# Patient Record
Sex: MALE | Race: WHITE | NOT HISPANIC OR LATINO
[De-identification: names, ages, dates, MRNs, and addresses within clinical notes are randomized per-mention and may not be internally consistent; named-entity substitution may affect disease eponyms.]

---

## 2017-01-27 ENCOUNTER — RECORD ABSTRACTING (OUTPATIENT)
Age: 67
End: 2017-01-27

## 2017-01-27 DIAGNOSIS — M21.162 VARUS DEFORMITY, NOT ELSEWHERE CLASSIFIED, LEFT KNEE: ICD-10-CM

## 2017-01-27 DIAGNOSIS — Z82.62 FAMILY HISTORY OF OSTEOPOROSIS: ICD-10-CM

## 2017-01-27 DIAGNOSIS — Z85.9 PERSONAL HISTORY OF MALIGNANT NEOPLASM, UNSPECIFIED: ICD-10-CM

## 2017-01-27 DIAGNOSIS — M25.562 PAIN IN LEFT KNEE: ICD-10-CM

## 2017-04-10 ENCOUNTER — APPOINTMENT (OUTPATIENT)
Dept: ORTHOPEDIC SURGERY | Facility: CLINIC | Age: 67
End: 2017-04-10

## 2017-04-10 VITALS
HEIGHT: 72 IN | BODY MASS INDEX: 25.73 KG/M2 | DIASTOLIC BLOOD PRESSURE: 79 MMHG | SYSTOLIC BLOOD PRESSURE: 135 MMHG | WEIGHT: 190 LBS | HEART RATE: 58 BPM

## 2017-04-10 DIAGNOSIS — Z96.652 PRESENCE OF LEFT ARTIFICIAL KNEE JOINT: ICD-10-CM

## 2017-04-10 DIAGNOSIS — M17.12 UNILATERAL PRIMARY OSTEOARTHRITIS, LEFT KNEE: ICD-10-CM

## 2021-12-21 ENCOUNTER — APPOINTMENT (OUTPATIENT)
Dept: VASCULAR SURGERY | Facility: CLINIC | Age: 71
End: 2021-12-21

## 2022-01-25 ENCOUNTER — APPOINTMENT (OUTPATIENT)
Dept: VASCULAR SURGERY | Facility: CLINIC | Age: 72
End: 2022-01-25
Payer: MEDICARE

## 2022-01-25 VITALS
HEART RATE: 65 BPM | HEIGHT: 72 IN | SYSTOLIC BLOOD PRESSURE: 138 MMHG | WEIGHT: 183 LBS | DIASTOLIC BLOOD PRESSURE: 80 MMHG | BODY MASS INDEX: 24.79 KG/M2

## 2022-01-25 PROCEDURE — 99204 OFFICE O/P NEW MOD 45 MIN: CPT

## 2022-01-25 PROCEDURE — 93880 EXTRACRANIAL BILAT STUDY: CPT

## 2022-01-25 RX ORDER — TAMSULOSIN HYDROCHLORIDE 0.4 MG/1
0.4 CAPSULE ORAL
Refills: 0 | Status: ACTIVE | COMMUNITY

## 2022-01-25 RX ORDER — HYDROMORPHONE HYDROCHLORIDE 4 MG/1
4 TABLET ORAL
Refills: 0 | Status: DISCONTINUED | COMMUNITY
End: 2022-01-25

## 2022-01-25 RX ORDER — CELECOXIB 200 MG/1
200 CAPSULE ORAL
Refills: 0 | Status: DISCONTINUED | COMMUNITY
End: 2022-01-25

## 2022-01-28 NOTE — ASSESSMENT
[FreeTextEntry1] : 71yoM w/PMHx HTN, HLD, BPH, current "on/off cigarette smoker" but daily marijuana use, referred by Dr. Bell for evaluation of his asymptomatic carotid stenosis found on screening carotid sonogram.  Pt denies any neurologic symptoms that might be related to his carotid stenosis, and denies any family h/o CVA/TIA.  He is currently compliant w/ASA/statin therapy.  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\par \par No neuro deficits noted on exam and carotid duplex performed to assess for ICA stenosis reveals L ICA stenosis <50% w/large heterogenous plaque at bulb, mild <50% R ICA stenosis, good antegrade flow noted in vertebral arteries b/l.  Recommended that pt continue all medications and RTO in 1y for surveillance duplex.\par \par I, Dr. Asael Reis, personally performed the evaluation and management (E/M) services for this new patient.  That E/M includes conducting the initial examination, assessing all conditions, and establishing the plan of care.  Today, ACP, Tom Navarro, was here to observe my evaluation and management services for this patient to be followed going forward.

## 2022-01-28 NOTE — PHYSICAL EXAM
[Normal Thyroid] : the thyroid was normal [Normal Breath Sounds] : Normal breath sounds [Respiratory Effort] : normal respiratory effort [Normal Heart Sounds] : normal heart sounds [Normal Rate and Rhythm] : normal rate and rhythm [2+] : left 2+ [No HSM] : no hepatosplenomegaly [No Rash or Lesion] : No rash or lesion [Alert] : alert [Calm] : calm [JVD] : no jugular venous distention  [Carotid Bruits] : no carotid bruits [Right Carotid Bruit] : no bruit heard over the right carotid [Left Carotid Bruit] : no bruit heard over the left carotid [Ankle Swelling (On Exam)] : not present [Varicose Veins Of Lower Extremities] : not present [] : not present [Abdomen Masses] : No abdominal masses [Abdomen Tenderness] : ~T ~M No abdominal tenderness [de-identified] : Well-nourished, NAD [de-identified] : NC/AT, sandraicthumaira, EOMIx6 [de-identified] : FROM throughout, strength 5/5x4 [de-identified] : CNII-XII/AIME grossly intact, steady gait

## 2022-01-28 NOTE — HISTORY OF PRESENT ILLNESS
[FreeTextEntry1] : 71yoM w/PMHx HTN, HLD, BPH, current "on/off cigarette smoker" but daily marijuana use, referred by Dr. Bell for evaluation of his asymptomatic carotid stenosis found on screening carotid sonogram.  Pt denies any neurologic symptoms that might be related to his carotid stenosis, and denies any family h/o CVA/TIA.  He is currently compliant w/ASA/statin therapy.  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

## 2022-01-28 NOTE — ADDENDUM
[FreeTextEntry1] : This note was written by Tom Garcia, acting as a scribe for Dr. Asael Reis.  I, Dr. Asael Reis, have read and attest that all the information, medical decision-making, and discharge instructions within are true and accurate.\par \par I, Dr. Asael Reis, personally performed the evaluation and management (E/M) services for this new patient.  That E/M includes conducting the initial examination, assessing all conditions, and establishing the plan of care.  Today, my ACP, Tom Garcia, was here to observe my evaluation and management services for this patient to be followed going forward.

## 2022-01-28 NOTE — PROCEDURE
[FreeTextEntry1] : Carotid duplex performed to assess for ICA stenosis reveals L ICA stenosis <50% w/large heterogenous plaque at bulb, mild <50% R ICA stenosis, good antegrade flow noted in vertebral arteries b/l

## 2022-07-23 ENCOUNTER — RESULT REVIEW (OUTPATIENT)
Age: 72
End: 2022-07-23

## 2022-07-25 ENCOUNTER — RESULT REVIEW (OUTPATIENT)
Age: 72
End: 2022-07-25

## 2022-07-26 ENCOUNTER — TRANSCRIPTION ENCOUNTER (OUTPATIENT)
Age: 72
End: 2022-07-26

## 2022-07-26 ENCOUNTER — APPOINTMENT (OUTPATIENT)
Dept: VASCULAR SURGERY | Facility: CLINIC | Age: 72
End: 2022-07-26

## 2022-08-01 ENCOUNTER — TRANSCRIPTION ENCOUNTER (OUTPATIENT)
Age: 72
End: 2022-08-01

## 2022-08-03 ENCOUNTER — RESULT REVIEW (OUTPATIENT)
Age: 72
End: 2022-08-03

## 2022-08-08 ENCOUNTER — APPOINTMENT (OUTPATIENT)
Dept: NEUROSURGERY | Facility: CLINIC | Age: 72
End: 2022-08-08
Payer: MEDICARE

## 2022-08-08 PROCEDURE — XXXXX: CPT | Mod: 1L

## 2022-08-08 NOTE — ASSESSMENT
[FreeTextEntry1] : Mr. Morrell presents status post right craniotomy for evacuation of subdural hematoma under the direction of Dr. Demetrio Killian. He remains clinically stable.  Surveillance CT head from 8/4/22 reveals a slight interval increase in volume of residual right frontoparietal fluid when compared with CT head from 7/25/22.\par \par Natural history discussed. Alternative management strategies reviewed.  I plan to obtain a CT head without contrast in 1 week with an appointment to follow to assess for interval increase. \par \par I have asked the patient to contact me or Dr. Killian for any symptomatic development or progression in the interim at which time we can obtain expedited follow up imaging.\par \par A total of 45 minutes were spent relative to this encounter.\par

## 2022-08-08 NOTE — HISTORY OF PRESENT ILLNESS
[FreeTextEntry1] : Mr. Morrell present sin neurosurgical follow up status post right craniotomy for evacuation of subdural hematoma. He has a PMHx of diverticulosis, HTN, and on ASA prophylaxis. On 7/23/22, he presented to the emergency room at [facility] with one week complain of gait instability and left- sided weakness. Patient reports upon return from [place] last month, he noticed  drifting to the left while walking with intermittent episodes involving bumping into walls, table, and chairs. In addition, reports  difficulty with fine motor skills, such as buttoning his shirt and tying his shoes as well as transient self limited headaches. Initial evaluation was arranged by his outpatient Primary care physician , Dr. Suh, which prompted a MRI brain. MRI brain and follow up CT head from 7/23/22 revealed a large right-sided subdural hematoma with right-to-left midline shift. Patient underwent a right craniotomy for evacuation of subdural hematoma on 7/23/22 under the direction of Dr. Demetrio Killian. Neurological examination was within normal limits. Hospital course uncomplicated and he was discharged on 7/26/22 with prescribed keppra. \par \par Today he denies complete resolution of initial symptoms. Reports right sided transient headaches, which are self limited and resolve without medication management. He has completed prescribed dosage of Keppra. Surveillance CT head from 8/4/22, detailed below. Denies other neurological symptoms. Presently, patient denies headache.

## 2022-08-08 NOTE — DATA REVIEWED
[de-identified] : CT HEAD WITHOUT CONTRAST: 8/4/22: IMPRESSION- Predominantly hypodense right frontoparietal subdural collection. The maximum \par  diameter of the collection measures 1.5 cm, slightly increased from the prior \par  study at which time it measured 1.3 cm. No midline shift. The ventricular \par  system is stable. No new intracranial hemorrhage. \par CT HEAD WITHOUT CONTRAST: 7/25/22: IMPRESSION- Interval removal of right subdural drain without substantial change in right subdural collection. \par Increased right pneumocephalus. No significant mass effect or midline shift. [de-identified] : MRI BRAIN WITHOUT CONTRAST: IMPRESSION- 7/23/22: IMPRESSION- Moderately large right frontal parietal holohemispheric subacute subdural hematoma with\par midline shift to the left.  Previously Declined (within the last year)

## 2022-08-08 NOTE — PHYSICAL EXAM
[General Appearance - Alert] : alert [General Appearance - In No Acute Distress] : in no acute distress [General Appearance - Well Nourished] : well nourished [Oriented To Time, Place, And Person] : oriented to person, place, and time [Impaired Insight] : insight and judgment were intact [Affect] : the affect was normal [Cranial Nerves Oculomotor (III)] : extraocular motion intact [Cranial Nerves Optic (II)] : visual acuity intact bilaterally,  pupils equal round and reactive to light [Cranial Nerves Trigeminal (V)] : facial sensation intact symmetrically [Cranial Nerves Facial (VII)] : face symmetrical [Cranial Nerves Vestibulocochlear (VIII)] : hearing was intact bilaterally [Cranial Nerves Glossopharyngeal (IX)] : tongue and palate midline [Cranial Nerves Accessory (XI - Cranial And Spinal)] : head turning and shoulder shrug symmetric [Cranial Nerves Hypoglossal (XII)] : there was no tongue deviation with protrusion [Motor Tone] : muscle tone was normal in all four extremities [Motor Strength] : muscle strength was normal in all four extremities [Sensation Tactile Decrease] : light touch was intact [Abnormal Walk] : normal gait [Balance] : balance was intact [Longitudinal] : longitudinal [Clean] : clean [Dry] : dry [Intact] : intact [No Drainage] : without drainage [Normal Skin] : normal [FreeTextEntry1] : Right frontoparietal

## 2022-08-14 ENCOUNTER — RESULT REVIEW (OUTPATIENT)
Age: 72
End: 2022-08-14

## 2022-08-16 ENCOUNTER — APPOINTMENT (OUTPATIENT)
Dept: NEUROSURGERY | Facility: CLINIC | Age: 72
End: 2022-08-16

## 2022-08-16 PROCEDURE — 99024 POSTOP FOLLOW-UP VISIT: CPT

## 2022-08-16 NOTE — HISTORY OF PRESENT ILLNESS
[FreeTextEntry1] : Mr. Morrell presents in neurosurgical follow up. Underwent right craniotomy for evacuation of subacute subdural hematoma on 7/23/22. He has had complete resolution of preoperative symptoms and has no neurological complaints. Surveillance CT head 8/15/22 detailed below. Denies neurological symptoms at this time.

## 2022-08-16 NOTE — ASSESSMENT
[FreeTextEntry1] : Mr. Morrell presents status post right craniotomy for evacuation of subacute subdural hematoma. He is neurologically normal. CT 8/15/22 reviewed independently by me demonstrates stable volume of residual right hemispheric subdural fluid products when compared directly with CT head 8/4/22. There is no indication for additional neurosurgical intervention at this time. I plan to obtain surveillance CT head without contrast in 2 weeks with an appointment to follow. \par \par I have asked the patient to contact me for any symptomatic development or progression in the interim at which time we can obtain expedited follow up imaging.\par \par A total of 45 minutes were spent relative to this encounter.\par \par \par \par

## 2022-08-16 NOTE — DATA REVIEWED
[de-identified] : CT HEAD WITHOUT CONTRAST: 8/15/22: IMPRESSION- Status post right parietal craniotomy. Stable overall size of small to \par  moderate broad-based right hemispheric convexity subdural collection with \par  interval increased intermediate density within the subdural collection which \par  may represent a component of subacute hemorrhage. Residual slight mass effect \par  on right hemisphere and slight midline shift to left. Recommend follow-up to \par  resolution. \par \par  No acute intracerebral hemorrhage.

## 2022-08-29 ENCOUNTER — RESULT REVIEW (OUTPATIENT)
Age: 72
End: 2022-08-29

## 2022-08-30 ENCOUNTER — APPOINTMENT (OUTPATIENT)
Dept: NEUROSURGERY | Facility: CLINIC | Age: 72
End: 2022-08-30

## 2022-08-30 VITALS
WEIGHT: 176 LBS | OXYGEN SATURATION: 98 % | HEART RATE: 79 BPM | BODY MASS INDEX: 23.84 KG/M2 | HEIGHT: 72 IN | SYSTOLIC BLOOD PRESSURE: 120 MMHG | DIASTOLIC BLOOD PRESSURE: 70 MMHG | RESPIRATION RATE: 12 BRPM

## 2022-08-30 PROCEDURE — 99024 POSTOP FOLLOW-UP VISIT: CPT

## 2022-08-30 NOTE — ASSESSMENT
[FreeTextEntry1] : Mr. Morrell presents status post right craniotomy for evacuation of subacute subdural hematoma. He is neurologically normal. CT 8/30/22 reviewed independently by me demonstrates interval modest decrease in volume of residual right hemispheric subdural fluid products when compared directly with CT head 8/15/22. There is an increase in density of some of the subdural fluid, suggesting membrane formation. Given the overall stability versus decrease in volume, there is no indication for additional neurosurgical intervention at this time. I plan to obtain a routine non-contrast head CT in 3 weeks time. I will contact the patient by phone once I have had the opportunity to review this next CT.\par \par I have asked the patient to contact me for any symptomatic development or progression in the interim at which time we can obtain expedited follow up imaging.\par \par A total of 45 minutes were spent relative to this encounter.\par

## 2022-08-30 NOTE — PHYSICAL EXAM
[General Appearance - Alert] : alert [General Appearance - In No Acute Distress] : in no acute distress [General Appearance - Well Nourished] : well nourished [Oriented To Time, Place, And Person] : oriented to person, place, and time [Impaired Insight] : insight and judgment were intact [Affect] : the affect was normal [Cranial Nerves Optic (II)] : visual acuity intact bilaterally,  pupils equal round and reactive to light [Cranial Nerves Oculomotor (III)] : extraocular motion intact [Cranial Nerves Trigeminal (V)] : facial sensation intact symmetrically [Cranial Nerves Facial (VII)] : face symmetrical [Cranial Nerves Vestibulocochlear (VIII)] : hearing was intact bilaterally [Cranial Nerves Glossopharyngeal (IX)] : tongue and palate midline [Cranial Nerves Accessory (XI - Cranial And Spinal)] : head turning and shoulder shrug symmetric [Cranial Nerves Hypoglossal (XII)] : there was no tongue deviation with protrusion [Motor Tone] : muscle tone was normal in all four extremities [Motor Strength] : muscle strength was normal in all four extremities [Sensation Tactile Decrease] : light touch was intact [Abnormal Walk] : normal gait [Balance] : balance was intact [Longitudinal] : longitudinal [Clean] : clean [Dry] : dry [Intact] : intact [No Drainage] : without drainage [FreeTextEntry1] : right parietal

## 2022-08-30 NOTE — HISTORY OF PRESENT ILLNESS
[FreeTextEntry1] : Mr. Morrell presents in neurosurgical follow up. Underwent right craniotomy for evacuation of subacute subdural hematoma on 7/23/22. He has had complete resolution of preoperative symptoms and has no neurological complaints. Surveillance CT head 8/30/22 detailed below. Denies neurological symptoms at this time, including headaches, gait instability, extremity numbness/weakness, visual disturbances\par

## 2022-08-30 NOTE — DATA REVIEWED
[de-identified] : CT HEAD WITHOUT CONTRAST: 8/30/22: reviewed independently by me demonstrates mixed density right-sided subdural hematoma, stable compared to prior.  No new mass effect or midline shift

## 2022-09-06 ENCOUNTER — APPOINTMENT (OUTPATIENT)
Dept: VASCULAR SURGERY | Facility: CLINIC | Age: 72
End: 2022-09-06

## 2022-09-06 VITALS
DIASTOLIC BLOOD PRESSURE: 74 MMHG | HEIGHT: 72 IN | WEIGHT: 176 LBS | SYSTOLIC BLOOD PRESSURE: 132 MMHG | BODY MASS INDEX: 23.84 KG/M2 | HEART RATE: 72 BPM

## 2022-09-06 PROCEDURE — 99213 OFFICE O/P EST LOW 20 MIN: CPT

## 2022-09-06 PROCEDURE — 93880 EXTRACRANIAL BILAT STUDY: CPT

## 2022-09-09 NOTE — HISTORY OF PRESENT ILLNESS
[FreeTextEntry1] : 72yoM w/PMHx HTN, HLD, BPH, "on/off cigarette smoker" but daily marijuana use, originally referred by Dr. Bell for evaluation of his asymptomatic carotid stenosis found on screening carotid sonogram, returning today for surveillance.  Pt sustained a trauma to the head 3mos prior and was diagnosed w/an intracranial bleed that later caused headaches.  He underwent craniotomy and evacuation of the bleed, stabilized, and was discharged home w/o ASA.  Currently, pt denies any neurologic symptoms that might be related to his carotid stenosis, still not taking daily ASA, but is due to f/u w/neuro in 1wk at which time, they will instruct him on future SAPT.  He is active and compliant w/all other meds.

## 2022-09-09 NOTE — PHYSICAL EXAM
[Normal Thyroid] : the thyroid was normal [Normal Breath Sounds] : Normal breath sounds [Respiratory Effort] : normal respiratory effort [Normal Heart Sounds] : normal heart sounds [Normal Rate and Rhythm] : normal rate and rhythm [2+] : left 2+ [No HSM] : no hepatosplenomegaly [No Rash or Lesion] : No rash or lesion [Alert] : alert [Calm] : calm [JVD] : no jugular venous distention  [Carotid Bruits] : no carotid bruits [Right Carotid Bruit] : no bruit heard over the right carotid [Left Carotid Bruit] : no bruit heard over the left carotid [Ankle Swelling (On Exam)] : not present [Varicose Veins Of Lower Extremities] : not present [] : not present [Abdomen Masses] : No abdominal masses [Abdomen Tenderness] : ~T ~M No abdominal tenderness [de-identified] : Well-nourished, NAD [de-identified] : NC/AT, sandraicthumaira, EOMIx6 [de-identified] : FROM throughout, strength 5/5x4 [de-identified] : CNII-XII/AIME grossly intact, steady gait

## 2022-09-09 NOTE — ASSESSMENT
[FreeTextEntry1] : 72yoM w/PMHx HTN, HLD, BPH, "on/off cigarette smoker" but daily marijuana use, originally referred by Dr. Bell for evaluation of his asymptomatic carotid stenosis found on screening carotid sonogram, returning today for surveillance.  Pt sustained a trauma to the head 3mos prior and was diagnosed w/an intracranial bleed that later caused headaches.  He underwent craniotomy and evacuation of the bleed, stabilized, and was discharged home w/o ASA.  Currently, pt denies any neurologic symptoms that might be related to his carotid stenosis, still not taking daily ASA, but is due to f/u w/neuro in 1wk at which time, they will instruct him on future SAPT.  He is active and compliant w/all other meds.\par \par No neuro deficits noted on exam and large carotid duplex performed to assess for ICA stenosis reveals L ICA stenosis <50% w/large heterogenous plaque at bulb, mild <50% R ICA stenosis, good antegrade flow noted in vertebral arteries b/l.  Recommended that pt continue all medications and RTO in 1y for surveillance duplex and restart daily ASA as soon as possible to minimize stroke risk.  Pt will contact us after his appointment w/neurology.

## 2022-09-28 ENCOUNTER — RESULT REVIEW (OUTPATIENT)
Age: 72
End: 2022-09-28

## 2022-11-28 ENCOUNTER — RESULT REVIEW (OUTPATIENT)
Age: 72
End: 2022-11-28

## 2022-12-06 ENCOUNTER — APPOINTMENT (OUTPATIENT)
Dept: SURGERY | Facility: CLINIC | Age: 72
End: 2022-12-06

## 2022-12-06 VITALS
HEART RATE: 71 BPM | WEIGHT: 184 LBS | TEMPERATURE: 98.6 F | SYSTOLIC BLOOD PRESSURE: 152 MMHG | HEIGHT: 72 IN | BODY MASS INDEX: 24.92 KG/M2 | DIASTOLIC BLOOD PRESSURE: 79 MMHG

## 2022-12-06 PROCEDURE — 99204 OFFICE O/P NEW MOD 45 MIN: CPT

## 2022-12-06 RX ORDER — DUTASTERIDE 0.5 MG/1
0.5 CAPSULE, LIQUID FILLED ORAL
Qty: 90 | Refills: 0 | Status: ACTIVE | COMMUNITY
Start: 2022-08-10

## 2022-12-06 RX ORDER — ROSUVASTATIN CALCIUM 40 MG/1
40 TABLET, FILM COATED ORAL
Qty: 90 | Refills: 0 | Status: ACTIVE | COMMUNITY
Start: 2022-11-11

## 2022-12-06 NOTE — HISTORY OF PRESENT ILLNESS
[de-identified] : The patient is referred by Dr Sonny Suh for evaluation and discussion regarding an enlarging right inguinal hernia..\par \par He has some discomfort and swelling which has been progressing over the past few year. It is certainly larger and he hears bowel sounds at times.  \par

## 2022-12-06 NOTE — ASSESSMENT
[FreeTextEntry1] : right inguinal hernia, recommended a robotic assisted laparoscopic RIHR with mesh, possible left, possible open\par The risks benefits and alternatives were discussed and the patient agrees to the described plan.\par

## 2022-12-06 NOTE — PHYSICAL EXAM
[Normal Breath Sounds] : Normal breath sounds [Normal Heart Sounds] : normal heart sounds [Alert] : alert [Oriented to Person] : oriented to person [Oriented to Place] : oriented to place [Oriented to Time] : oriented to time [Calm] : calm [de-identified] : NAD [de-identified] : soft, benign, right inguinal hernia

## 2022-12-20 ENCOUNTER — APPOINTMENT (OUTPATIENT)
Dept: NEUROSURGERY | Facility: CLINIC | Age: 72
End: 2022-12-20

## 2022-12-20 PROCEDURE — 99215 OFFICE O/P EST HI 40 MIN: CPT | Mod: 95

## 2022-12-20 NOTE — HISTORY OF PRESENT ILLNESS
[FreeTextEntry1] : Mr. Morrell has agreed to two-way audiovisual telehealth consultation. He is located at her home [address] [place] [address]  and I am located at my office at [facility].\par \par  presents in neurosurgical follow up. Underwent right craniotomy for evacuation of subacute subdural hematoma on 7/23/22 with complete resolution of preoperative symptoms and has no neurological complaints. Surveillance CT 8/30/22 demonstrate interval modest decrease in volume of residual right hemispheric subdural fluid products with an increase in density of some of the subdural fluid, suggesting membrane formation. Plan at that time was to repeat CT head without contrast in 3 weeks. Due to personal obligations, patient delayed follow up. Recent CT 11/29/22 detailed below.

## 2022-12-20 NOTE — ASSESSMENT
[FreeTextEntry1] : Mr. Morrell presents status post right craniotomy for evacuation of subacute subdural hematoma. He is neurologically normal. CT 11/29//22 reviewed independently by me demonstrates interval complete resolution of residual right parietal convexity subdural blood products and thin left temporal parietal convexity subdural hematoma compared with prior exam from 9/29/2022. \par \par There is no indication for additional neurosurgical intervention or scheduled imaging surveillance at this time. I have asked the patient to contact me for any symptomatic development or progression in the interim at which time we can obtain expedited follow up imaging.\par \par A total of 45 minutes were spent relative to this encounter.\par

## 2022-12-20 NOTE — DATA REVIEWED
[de-identified] : CT HEAD WITHOUT CONTRAST: 11/29/22: IMPRESSION- Intervertebral resolution of thin right parietal convexity subdural hematoma\par and thin left temporal parietal convexity subdural hematoma compared with\par prior exam from 9/29/2022. No new acute intracranial hemorrhage.\par CT HEAD WITHOUT CONTRAST: 9/29/22: IMPRESSION-Status post previous right parietal craniotomy. Mild to moderate reduction in\par size of late subacute right frontoparietal convexity subdural hematoma.\par \par No new or increasing intracranial hemorrhage.\par

## 2023-01-18 ENCOUNTER — APPOINTMENT (OUTPATIENT)
Dept: SURGERY | Facility: CLINIC | Age: 73
End: 2023-01-18
Payer: MEDICARE

## 2023-01-18 VITALS
SYSTOLIC BLOOD PRESSURE: 146 MMHG | DIASTOLIC BLOOD PRESSURE: 80 MMHG | WEIGHT: 180 LBS | HEART RATE: 66 BPM | TEMPERATURE: 98.4 F | HEIGHT: 72 IN | BODY MASS INDEX: 24.38 KG/M2

## 2023-01-18 PROCEDURE — 99214 OFFICE O/P EST MOD 30 MIN: CPT

## 2023-01-18 NOTE — PHYSICAL EXAM
[JVD] : no jugular venous distention  [de-identified] : well appearing [de-identified] : soft, NT/ND, large right inguinal hernia

## 2023-01-18 NOTE — HISTORY OF PRESENT ILLNESS
[de-identified] : Patient of Dr. Mayur Sanchez who was seen for discussion regarding an enlarging right inguinal hernia.\par  [de-identified] : He has some discomfort and swelling which has been progressing over the past few year. It is certainly larger and he hears bowel sounds at times.  The bulge is large and difficult to reduce and often causes him some pain.  No N/V

## 2023-01-23 ENCOUNTER — APPOINTMENT (OUTPATIENT)
Dept: SURGERY | Facility: HOSPITAL | Age: 73
End: 2023-01-23

## 2023-01-24 ENCOUNTER — TRANSCRIPTION ENCOUNTER (OUTPATIENT)
Age: 73
End: 2023-01-24

## 2023-01-24 ENCOUNTER — APPOINTMENT (OUTPATIENT)
Dept: SURGERY | Facility: HOSPITAL | Age: 73
End: 2023-01-24

## 2023-02-08 ENCOUNTER — APPOINTMENT (OUTPATIENT)
Dept: SURGERY | Facility: CLINIC | Age: 73
End: 2023-02-08
Payer: MEDICARE

## 2023-02-08 VITALS
DIASTOLIC BLOOD PRESSURE: 80 MMHG | WEIGHT: 182 LBS | SYSTOLIC BLOOD PRESSURE: 124 MMHG | HEART RATE: 71 BPM | TEMPERATURE: 98.4 F | HEIGHT: 72 IN | BODY MASS INDEX: 24.65 KG/M2

## 2023-02-08 PROCEDURE — 99024 POSTOP FOLLOW-UP VISIT: CPT

## 2023-02-08 NOTE — PHYSICAL EXAM
[Normal Rate and Rhythm] : normal rate and rhythm [No Rash or Lesion] : No rash or lesion [JVD] : no jugular venous distention  [de-identified] : soft, NT/ND, incisions well healed

## 2023-02-08 NOTE — HISTORY OF PRESENT ILLNESS
[de-identified] : s/p robotic right inguinal hernia repair with mesh [de-identified] : doing well.  He is tolerating a diet and has no complaints.

## 2023-02-08 NOTE — CONSULT LETTER
[Dear  ___] : Dear  [unfilled], [Consult Letter:] : I had the pleasure of evaluating your patient, [unfilled]. [Please see my note below.] : Please see my note below. [Consult Closing:] : Thank you very much for allowing me to participate in the care of this patient.  If you have any questions, please do not hesitate to contact me. [Sincerely,] : Sincerely, [FreeTextEntry3] : Mary Mcfarland MD

## 2023-03-07 ENCOUNTER — APPOINTMENT (OUTPATIENT)
Dept: VASCULAR SURGERY | Facility: CLINIC | Age: 73
End: 2023-03-07
Payer: MEDICARE

## 2023-03-07 PROCEDURE — 99213 OFFICE O/P EST LOW 20 MIN: CPT

## 2023-03-07 PROCEDURE — 93880 EXTRACRANIAL BILAT STUDY: CPT

## 2023-03-14 NOTE — ASSESSMENT
[FreeTextEntry1] : 73yoM w/PMHx HTN, HLD, BPH, "on/off cigarette smoker" but daily marijuana use, originally referred by Dr. Bell for evaluation of his asymptomatic carotid stenosis found on screening carotid sonogram, returning today for surveillance.  Pt sustained a trauma to the head 3mos prior and was diagnosed w/an intracranial bleed that later caused headaches.  He underwent craniotomy and evacuation of the bleed, stabilized, and was discharged home w/o ASA.  Currently, pt denies any neurologic symptoms that might be related to his carotid stenosis, still not taking daily ASA, but is due to f/u w/neuro in 1wk at which time, they will instruct him on future SAPT.  He is active and compliant w/all other meds.\par \par No neuro deficits noted on exam and large carotid duplex performed to assess for ICA stenosis reveals L ICA stenosis <50% w/large heterogenous plaque at bulb, mild <50% R ICA stenosis, good antegrade flow noted in vertebral arteries b/l.  Recommended that pt continue all medications and RTO in 1y for surveillance duplex and restart daily ASA as soon as possible to minimize stroke risk.  Pt will contact us after his appointment w/neurology.

## 2023-03-14 NOTE — HISTORY OF PRESENT ILLNESS
[FreeTextEntry1] : 73yoM w/PMHx HTN, HLD, BPH, "on/off cigarette smoker" but daily marijuana use, originally referred by Dr. Bell for evaluation of his asymptomatic carotid stenosis found on screening carotid sonogram, returning today for surveillance.  Pt sustained a trauma to the head 3mos prior and was diagnosed w/an intracranial bleed that later caused headaches.  He underwent craniotomy and evacuation of the bleed, stabilized, and was discharged home w/o ASA.  Currently, pt denies any neurologic symptoms that might be related to his carotid stenosis, still not taking daily ASA, but is due to f/u w/neuro in 1wk at which time, they will instruct him on future SAPT.  He is active and compliant w/all other meds.

## 2023-03-14 NOTE — PHYSICAL EXAM
[Normal Thyroid] : the thyroid was normal [Normal Breath Sounds] : Normal breath sounds [Respiratory Effort] : normal respiratory effort [Normal Heart Sounds] : normal heart sounds [Normal Rate and Rhythm] : normal rate and rhythm [2+] : left 2+ [No HSM] : no hepatosplenomegaly [No Rash or Lesion] : No rash or lesion [Alert] : alert [Calm] : calm [JVD] : no jugular venous distention  [Carotid Bruits] : no carotid bruits [Right Carotid Bruit] : no bruit heard over the right carotid [Left Carotid Bruit] : no bruit heard over the left carotid [Ankle Swelling (On Exam)] : not present [Varicose Veins Of Lower Extremities] : not present [] : not present [Abdomen Masses] : No abdominal masses [Abdomen Tenderness] : ~T ~M No abdominal tenderness [de-identified] : Well-nourished, NAD [de-identified] : NC/AT, sandraicthumaira, EOMIx6 [de-identified] : FROM throughout, strength 5/5x4 [de-identified] : CNII-XII/AIME grossly intact, steady gait

## 2023-04-20 ENCOUNTER — APPOINTMENT (OUTPATIENT)
Dept: SURGERY | Facility: CLINIC | Age: 73
End: 2023-04-20
Payer: MEDICARE

## 2023-04-20 VITALS
HEART RATE: 77 BPM | WEIGHT: 182 LBS | HEIGHT: 72 IN | BODY MASS INDEX: 24.65 KG/M2 | TEMPERATURE: 97.6 F | DIASTOLIC BLOOD PRESSURE: 70 MMHG | SYSTOLIC BLOOD PRESSURE: 119 MMHG

## 2023-04-20 DIAGNOSIS — K40.90 UNILATERAL INGUINAL HERNIA, W/OUT OBSTRUCTION OR GANGRENE, NOT SPECIFIED AS RECURRENT: ICD-10-CM

## 2023-04-20 PROCEDURE — 99024 POSTOP FOLLOW-UP VISIT: CPT

## 2023-04-20 RX ORDER — ATORVASTATIN CALCIUM 20 MG/1
20 TABLET, FILM COATED ORAL
Refills: 0 | Status: DISCONTINUED | COMMUNITY
End: 2023-04-20

## 2023-04-20 NOTE — PHYSICAL EXAM
[Respiratory Effort] : normal respiratory effort [Normal Rate and Rhythm] : normal rate and rhythm [Alert] : alert [Calm] : calm [de-identified] : NAD [de-identified] : soft, NT/ND, right groin bulge consistent with inguinal hernia, difficult to reduce completely

## 2023-04-20 NOTE — HISTORY OF PRESENT ILLNESS
[de-identified] : recurrent right inguinal hernia [de-identified] : s/p robotic right inguinal hernia repair for large indirect hernia January 2023.  presents with recurrent right groin bulge.  states he has noticed it enlarging over the past month.  symptoms similar to his first hernia

## 2023-04-25 ENCOUNTER — TRANSCRIPTION ENCOUNTER (OUTPATIENT)
Age: 73
End: 2023-04-25

## 2023-04-25 ENCOUNTER — APPOINTMENT (OUTPATIENT)
Dept: SURGERY | Facility: HOSPITAL | Age: 73
End: 2023-04-25

## 2023-05-11 ENCOUNTER — APPOINTMENT (OUTPATIENT)
Dept: SURGERY | Facility: CLINIC | Age: 73
End: 2023-05-11
Payer: MEDICARE

## 2023-05-11 VITALS
HEART RATE: 75 BPM | WEIGHT: 183 LBS | BODY MASS INDEX: 24.79 KG/M2 | HEIGHT: 72 IN | SYSTOLIC BLOOD PRESSURE: 126 MMHG | DIASTOLIC BLOOD PRESSURE: 75 MMHG

## 2023-05-11 PROCEDURE — 99024 POSTOP FOLLOW-UP VISIT: CPT

## 2023-05-11 NOTE — HISTORY OF PRESENT ILLNESS
[de-identified] : s/p open right inguinal hernia repair with mesh [de-identified] : doing well.  some mild discomfort at right groin.  states swelling continues to improve. ambulating without difficulty

## 2023-07-11 ENCOUNTER — NON-APPOINTMENT (OUTPATIENT)
Age: 73
End: 2023-07-11

## 2023-07-11 ENCOUNTER — APPOINTMENT (OUTPATIENT)
Dept: CARDIOLOGY | Facility: CLINIC | Age: 73
End: 2023-07-11
Payer: MEDICARE

## 2023-07-11 VITALS
BODY MASS INDEX: 24.25 KG/M2 | OXYGEN SATURATION: 94 % | WEIGHT: 183 LBS | HEIGHT: 73 IN | HEART RATE: 65 BPM | SYSTOLIC BLOOD PRESSURE: 139 MMHG | DIASTOLIC BLOOD PRESSURE: 70 MMHG

## 2023-07-11 VITALS — SYSTOLIC BLOOD PRESSURE: 112 MMHG | DIASTOLIC BLOOD PRESSURE: 68 MMHG

## 2023-07-11 DIAGNOSIS — Z87.891 PERSONAL HISTORY OF NICOTINE DEPENDENCE: ICD-10-CM

## 2023-07-11 DIAGNOSIS — Z78.9 OTHER SPECIFIED HEALTH STATUS: ICD-10-CM

## 2023-07-11 DIAGNOSIS — S82.92XA UNSPECIFIED FRACTURE OF LEFT LOWER LEG, INITIAL ENCOUNTER FOR CLOSED FRACTURE: ICD-10-CM

## 2023-07-11 DIAGNOSIS — Z86.79 PERSONAL HISTORY OF OTHER DISEASES OF THE CIRCULATORY SYSTEM: ICD-10-CM

## 2023-07-11 DIAGNOSIS — N40.0 BENIGN PROSTATIC HYPERPLASIA WITHOUT LOWER URINARY TRACT SYMPMS: ICD-10-CM

## 2023-07-11 DIAGNOSIS — Z87.19 PERSONAL HISTORY OF OTHER DISEASES OF THE DIGESTIVE SYSTEM: ICD-10-CM

## 2023-07-11 PROCEDURE — 93000 ELECTROCARDIOGRAM COMPLETE: CPT

## 2023-07-11 PROCEDURE — 99205 OFFICE O/P NEW HI 60 MIN: CPT | Mod: 25

## 2023-07-12 ENCOUNTER — NON-APPOINTMENT (OUTPATIENT)
Age: 73
End: 2023-07-12

## 2023-07-13 PROBLEM — Z87.19 HISTORY OF RIGHT INGUINAL HERNIA: Status: RESOLVED | Noted: 2022-12-06 | Resolved: 2023-02-07

## 2023-07-13 PROBLEM — Z86.79 HISTORY OF SUBDURAL HEMATOMA: Status: RESOLVED | Noted: 2022-07-25 | Resolved: 2023-07-13

## 2023-07-13 PROBLEM — N40.0 BPH (BENIGN PROSTATIC HYPERPLASIA): Status: ACTIVE | Noted: 2023-07-13

## 2023-07-13 NOTE — PHYSICAL EXAM
[Well Developed] : well developed [Well Nourished] : well nourished [No Acute Distress] : no acute distress [Normal Conjunctiva] : normal conjunctiva [Normal Venous Pressure] : normal venous pressure [No Carotid Bruit] : no carotid bruit [Normal S1, S2] : normal S1, S2 [No Rub] : no rub [No Gallop] : no gallop [Clear Lung Fields] : clear lung fields [Good Air Entry] : good air entry [No Respiratory Distress] : no respiratory distress  [Soft] : abdomen soft [Non Tender] : non-tender [No Masses/organomegaly] : no masses/organomegaly [Normal Bowel Sounds] : normal bowel sounds [Normal Gait] : normal gait [No Edema] : no edema [No Cyanosis] : no cyanosis [No Clubbing] : no clubbing [No Varicosities] : no varicosities [No Rash] : no rash [No Skin Lesions] : no skin lesions [Moves all extremities] : moves all extremities [No Focal Deficits] : no focal deficits [Normal Speech] : normal speech [Alert and Oriented] : alert and oriented [Normal memory] : normal memory [Murmur] : murmur [de-identified] : 2-3/6 TERE at base -. carotids
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
## 2023-07-13 NOTE — REASON FOR VISIT
Last OV:02/02/2023  Last refill: Adderall 02/20/23 30 tabs, 0 refill                 Adderall XR 02/20/2023, 30 tabs, 0 refill     Medication pended, please sign if appropriate [FreeTextEntry3] : Dr Sonny Suh

## 2023-09-07 ENCOUNTER — APPOINTMENT (OUTPATIENT)
Dept: CARDIOLOGY | Facility: CLINIC | Age: 73
End: 2023-09-07
Payer: MEDICARE

## 2023-09-07 VITALS
HEART RATE: 95 BPM | WEIGHT: 176 LBS | SYSTOLIC BLOOD PRESSURE: 138 MMHG | DIASTOLIC BLOOD PRESSURE: 69 MMHG | OXYGEN SATURATION: 95 % | HEIGHT: 73 IN | BODY MASS INDEX: 23.33 KG/M2

## 2023-09-07 DIAGNOSIS — I35.0 NONRHEUMATIC AORTIC (VALVE) STENOSIS: ICD-10-CM

## 2023-09-07 DIAGNOSIS — E78.5 HYPERLIPIDEMIA, UNSPECIFIED: ICD-10-CM

## 2023-09-07 DIAGNOSIS — Z92.89 PERSONAL HISTORY OF OTHER MEDICAL TREATMENT: ICD-10-CM

## 2023-09-07 DIAGNOSIS — Z01.810 ENCOUNTER FOR PREPROCEDURAL CARDIOVASCULAR EXAMINATION: ICD-10-CM

## 2023-09-07 DIAGNOSIS — I10 ESSENTIAL (PRIMARY) HYPERTENSION: ICD-10-CM

## 2023-09-07 PROCEDURE — 93000 ELECTROCARDIOGRAM COMPLETE: CPT

## 2023-09-07 PROCEDURE — 99214 OFFICE O/P EST MOD 30 MIN: CPT | Mod: 25

## 2023-09-07 NOTE — HISTORY OF PRESENT ILLNESS
[Preoperative Visit] : for a medical evaluation prior to surgery [Scheduled Procedure ___] : a [unfilled] [Date of Surgery ___] : on [unfilled] [Surgeon Name ___] : surgeon: [unfilled] [de-identified] : [facility] [FreeTextEntry1] : Change his schedule for prostate surgery He has no symptoms of chest pain, shortness of breath, palpitations or dizziness He exercises on his bike for about 15 minutes and does some weight/core exercises

## 2023-09-07 NOTE — PHYSICAL EXAM
[Well Developed] : well developed [Well Nourished] : well nourished [No Acute Distress] : no acute distress [Normal Venous Pressure] : normal venous pressure [No Carotid Bruit] : no carotid bruit [Normal S1, S2] : normal S1, S2 [No Rub] : no rub [No Gallop] : no gallop [Murmur] : murmur [Clear Lung Fields] : clear lung fields [Good Air Entry] : good air entry [No Respiratory Distress] : no respiratory distress  [Soft] : abdomen soft [Non Tender] : non-tender [No Masses/organomegaly] : no masses/organomegaly [Normal Bowel Sounds] : normal bowel sounds [Normal Gait] : normal gait [No Edema] : no edema [No Cyanosis] : no cyanosis [No Clubbing] : no clubbing [No Varicosities] : no varicosities [No Rash] : no rash [No Skin Lesions] : no skin lesions [Moves all extremities] : moves all extremities [No Focal Deficits] : no focal deficits [Normal Speech] : normal speech [Alert and Oriented] : alert and oriented [Normal memory] : normal memory [General Appearance - Well Developed] : well developed [Normal Appearance] : normal appearance [Well Groomed] : well groomed [General Appearance - Well Nourished] : well nourished [No Deformities] : no deformities [General Appearance - In No Acute Distress] : no acute distress [Normal Conjunctiva] : the conjunctiva exhibited no abnormalities [Eyelids - No Xanthelasma] : the eyelids demonstrated no xanthelasmas [Normal Oral Mucosa] : normal oral mucosa [No Oral Pallor] : no oral pallor [No Oral Cyanosis] : no oral cyanosis [Normal Jugular Venous A Waves Present] : normal jugular venous A waves present [Normal Jugular Venous V Waves Present] : normal jugular venous V waves present [No Jugular Venous Roy A Waves] : no jugular venous roy A waves [Respiration, Rhythm And Depth] : normal respiratory rhythm and effort [Exaggerated Use Of Accessory Muscles For Inspiration] : no accessory muscle use [Auscultation Breath Sounds / Voice Sounds] : lungs were clear to auscultation bilaterally [Heart Rate And Rhythm] : heart rate and rhythm were normal [Heart Sounds] : normal S1 and S2 [Abdomen Soft] : soft [FreeTextEntry1] : 2-3/6 TERE at base [Abdomen Tenderness] : non-tender [Abdomen Mass (___ Cm)] : no abdominal mass palpated [Abnormal Walk] : normal gait [Gait - Sufficient For Exercise Testing] : the gait was sufficient for exercise testing [Nail Clubbing] : no clubbing of the fingernails [Cyanosis, Localized] : no localized cyanosis [Petechial Hemorrhages (___cm)] : no petechial hemorrhages [Skin Color & Pigmentation] : normal skin color and pigmentation [] : no rash [No Venous Stasis] : no venous stasis [Skin Lesions] : no skin lesions [No Skin Ulcers] : no skin ulcer [No Xanthoma] : no  xanthoma was observed [Oriented To Time, Place, And Person] : oriented to person, place, and time [Affect] : the affect was normal [Mood] : the mood was normal [No Anxiety] : not feeling anxious [de-identified] : 2-3/6 TERE at base -. carotids

## 2023-09-07 NOTE — PHYSICAL EXAM
[Well Developed] : well developed [Well Nourished] : well nourished [No Acute Distress] : no acute distress [Normal Venous Pressure] : normal venous pressure [No Carotid Bruit] : no carotid bruit [Normal S1, S2] : normal S1, S2 [No Rub] : no rub [No Gallop] : no gallop [Murmur] : murmur [Clear Lung Fields] : clear lung fields [Good Air Entry] : good air entry [No Respiratory Distress] : no respiratory distress  [Soft] : abdomen soft [Non Tender] : non-tender [No Masses/organomegaly] : no masses/organomegaly [Normal Bowel Sounds] : normal bowel sounds [Normal Gait] : normal gait [No Edema] : no edema [No Cyanosis] : no cyanosis [No Clubbing] : no clubbing [No Varicosities] : no varicosities [No Rash] : no rash [No Skin Lesions] : no skin lesions [Moves all extremities] : moves all extremities [No Focal Deficits] : no focal deficits [Normal Speech] : normal speech [Alert and Oriented] : alert and oriented [Normal memory] : normal memory [General Appearance - Well Developed] : well developed [Normal Appearance] : normal appearance [Well Groomed] : well groomed [General Appearance - Well Nourished] : well nourished [No Deformities] : no deformities [General Appearance - In No Acute Distress] : no acute distress [Normal Conjunctiva] : the conjunctiva exhibited no abnormalities [Eyelids - No Xanthelasma] : the eyelids demonstrated no xanthelasmas [Normal Oral Mucosa] : normal oral mucosa [No Oral Pallor] : no oral pallor [No Oral Cyanosis] : no oral cyanosis [Normal Jugular Venous A Waves Present] : normal jugular venous A waves present [Normal Jugular Venous V Waves Present] : normal jugular venous V waves present [No Jugular Venous Roy A Waves] : no jugular venous roy A waves [Respiration, Rhythm And Depth] : normal respiratory rhythm and effort [Exaggerated Use Of Accessory Muscles For Inspiration] : no accessory muscle use [Auscultation Breath Sounds / Voice Sounds] : lungs were clear to auscultation bilaterally [Heart Rate And Rhythm] : heart rate and rhythm were normal [Heart Sounds] : normal S1 and S2 [Abdomen Soft] : soft [FreeTextEntry1] : 2-3/6 TERE at base [Abdomen Tenderness] : non-tender [Abdomen Mass (___ Cm)] : no abdominal mass palpated [Abnormal Walk] : normal gait [Gait - Sufficient For Exercise Testing] : the gait was sufficient for exercise testing [Nail Clubbing] : no clubbing of the fingernails [Cyanosis, Localized] : no localized cyanosis [Petechial Hemorrhages (___cm)] : no petechial hemorrhages [Skin Color & Pigmentation] : normal skin color and pigmentation [] : no rash [No Venous Stasis] : no venous stasis [Skin Lesions] : no skin lesions [No Skin Ulcers] : no skin ulcer [No Xanthoma] : no  xanthoma was observed [Oriented To Time, Place, And Person] : oriented to person, place, and time [Affect] : the affect was normal [Mood] : the mood was normal [No Anxiety] : not feeling anxious [de-identified] : 2-3/6 TERE at base -. carotids

## 2023-09-07 NOTE — HISTORY OF PRESENT ILLNESS
[Preoperative Visit] : for a medical evaluation prior to surgery [Scheduled Procedure ___] : a [unfilled] [Date of Surgery ___] : on [unfilled] [Surgeon Name ___] : surgeon: [unfilled] [de-identified] : [facility] [FreeTextEntry1] : Change his schedule for prostate surgery He has no symptoms of chest pain, shortness of breath, palpitations or dizziness He exercises on his bike for about 15 minutes and does some weight/core exercises

## 2024-03-05 ENCOUNTER — APPOINTMENT (OUTPATIENT)
Dept: VASCULAR SURGERY | Facility: CLINIC | Age: 74
End: 2024-03-05
Payer: MEDICARE

## 2024-03-05 VITALS
SYSTOLIC BLOOD PRESSURE: 141 MMHG | HEART RATE: 72 BPM | BODY MASS INDEX: 23.33 KG/M2 | WEIGHT: 176 LBS | HEIGHT: 73 IN | DIASTOLIC BLOOD PRESSURE: 65 MMHG

## 2024-03-05 DIAGNOSIS — I65.23 OCCLUSION AND STENOSIS OF BILATERAL CAROTID ARTERIES: ICD-10-CM

## 2024-03-05 PROCEDURE — 99213 OFFICE O/P EST LOW 20 MIN: CPT

## 2024-03-05 PROCEDURE — 93880 EXTRACRANIAL BILAT STUDY: CPT

## 2024-03-05 RX ORDER — VALSARTAN 80 MG/1
80 TABLET, COATED ORAL
Refills: 0 | Status: ACTIVE | COMMUNITY

## 2024-03-08 PROBLEM — I65.23 CAROTID STENOSIS, ASYMPTOMATIC, BILATERAL: Status: ACTIVE | Noted: 2022-01-25

## 2024-03-08 NOTE — ADDENDUM
[FreeTextEntry1] : This note was written by Tom Garcia, acting as a scribe for Dr. Asael Reis.  I, Dr. Asael Reis, have read and attest that all the information, medical decision-making, and discharge instructions within are true and accurate.  I, Dr. Asael Reis, personally performed the evaluation and management (E/M) services for this established patient who presents today with (an) existing condition(s).  That E/M includes conducting the examination, assessing all conditions, and (re)establishing/reinforcing a plan of care.  Today, my ACP, Tom Garcia, was here to observe my evaluation and management services for this condition to be followed going forward.

## 2024-03-08 NOTE — PHYSICAL EXAM
[JVD] : no jugular venous distention  [Normal Thyroid] : the thyroid was normal [Carotid Bruits] : no carotid bruits [Normal Breath Sounds] : Normal breath sounds [Respiratory Effort] : normal respiratory effort [Normal Heart Sounds] : normal heart sounds [Normal Rate and Rhythm] : normal rate and rhythm [Right Carotid Bruit] : no bruit heard over the right carotid [Left Carotid Bruit] : no bruit heard over the left carotid [2+] : left 2+ [Ankle Swelling (On Exam)] : not present [] : not present [Varicose Veins Of Lower Extremities] : not present [Abdomen Tenderness] : ~T ~M No abdominal tenderness [Abdomen Masses] : No abdominal masses [No HSM] : no hepatosplenomegaly [No Rash or Lesion] : No rash or lesion [Alert] : alert [Calm] : calm [de-identified] : Well-nourished, NAD [de-identified] : FROM throughout, strength 5/5x4 [de-identified] : NC/AT, sandraicthumaira, EOMIx6 [de-identified] : CNII-XII/AIME grossly intact, steady gait
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
## 2024-04-11 ENCOUNTER — TRANSCRIPTION ENCOUNTER (OUTPATIENT)
Age: 74
End: 2024-04-11

## 2024-12-25 PROBLEM — F10.90 ALCOHOL USE: Status: ACTIVE | Noted: 2017-01-27

## 2025-03-04 ENCOUNTER — APPOINTMENT (OUTPATIENT)
Dept: VASCULAR SURGERY | Facility: CLINIC | Age: 75
End: 2025-03-04
Payer: MEDICARE

## 2025-03-04 ENCOUNTER — NON-APPOINTMENT (OUTPATIENT)
Age: 75
End: 2025-03-04

## 2025-03-04 DIAGNOSIS — I65.23 OCCLUSION AND STENOSIS OF BILATERAL CAROTID ARTERIES: ICD-10-CM

## 2025-03-04 PROCEDURE — 99213 OFFICE O/P EST LOW 20 MIN: CPT

## 2025-03-04 PROCEDURE — 93880 EXTRACRANIAL BILAT STUDY: CPT

## 2025-03-04 RX ORDER — EZETIMIBE 10 MG/1
10 TABLET ORAL
Refills: 0 | Status: ACTIVE | COMMUNITY

## 2025-08-02 ENCOUNTER — RESULT REVIEW (OUTPATIENT)
Age: 75
End: 2025-08-02

## 2025-08-04 ENCOUNTER — RESULT REVIEW (OUTPATIENT)
Age: 75
End: 2025-08-04

## 2025-08-06 ENCOUNTER — TRANSCRIPTION ENCOUNTER (OUTPATIENT)
Age: 75
End: 2025-08-06